# Patient Record
Sex: FEMALE | Race: WHITE | Employment: FULL TIME | URBAN - METROPOLITAN AREA
[De-identification: names, ages, dates, MRNs, and addresses within clinical notes are randomized per-mention and may not be internally consistent; named-entity substitution may affect disease eponyms.]

---
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
## 2018-12-07 ENCOUNTER — OFFICE VISIT (OUTPATIENT)
Dept: FAMILY MEDICINE CLINIC | Age: 18
End: 2018-12-07
Payer: COMMERCIAL

## 2018-12-07 VITALS
SYSTOLIC BLOOD PRESSURE: 98 MMHG | HEART RATE: 88 BPM | TEMPERATURE: 98.7 F | DIASTOLIC BLOOD PRESSURE: 60 MMHG | BODY MASS INDEX: 20.33 KG/M2 | RESPIRATION RATE: 22 BRPM | OXYGEN SATURATION: 98 % | HEIGHT: 65 IN | WEIGHT: 122 LBS

## 2018-12-07 DIAGNOSIS — Z00.00 ANNUAL PHYSICAL EXAM: Primary | ICD-10-CM

## 2018-12-07 PROCEDURE — 99385 PREV VISIT NEW AGE 18-39: CPT | Performed by: NURSE PRACTITIONER

## 2018-12-07 ASSESSMENT — PATIENT HEALTH QUESTIONNAIRE - PHQ9
SUM OF ALL RESPONSES TO PHQ QUESTIONS 1-9: 0
SUM OF ALL RESPONSES TO PHQ QUESTIONS 1-9: 0
2. FEELING DOWN, DEPRESSED OR HOPELESS: 0
1. LITTLE INTEREST OR PLEASURE IN DOING THINGS: 0
SUM OF ALL RESPONSES TO PHQ9 QUESTIONS 1 & 2: 0

## 2018-12-07 NOTE — PATIENT INSTRUCTIONS
Patient Education        Well Visit, Ages 25 to 48: Care Instructions  Your Care Instructions    Physical exams can help you stay healthy. Your doctor has checked your overall health and may have suggested ways to take good care of yourself. He or she also may have recommended tests. At home, you can help prevent illness with healthy eating, regular exercise, and other steps. Follow-up care is a key part of your treatment and safety. Be sure to make and go to all appointments, and call your doctor if you are having problems. It's also a good idea to know your test results and keep a list of the medicines you take. How can you care for yourself at home? · Reach and stay at a healthy weight. This will lower your risk for many problems, such as obesity, diabetes, heart disease, and high blood pressure. · Get at least 30 minutes of physical activity on most days of the week. Walking is a good choice. You also may want to do other activities, such as running, swimming, cycling, or playing tennis or team sports. Discuss any changes in your exercise program with your doctor. · Do not smoke or allow others to smoke around you. If you need help quitting, talk to your doctor about stop-smoking programs and medicines. These can increase your chances of quitting for good. · Talk to your doctor about whether you have any risk factors for sexually transmitted infections (STIs). Having one sex partner (who does not have STIs and does not have sex with anyone else) is a good way to avoid these infections. · Use birth control if you do not want to have children at this time. Talk with your doctor about the choices available and what might be best for you. · Protect your skin from too much sun. When you're outdoors from 10 a.m. to 4 p.m., stay in the shade or cover up with clothing and a hat with a wide brim. Wear sunglasses that block UV rays.  Even when it's cloudy, put broad-spectrum sunscreen (SPF 30 or higher) on any condoms. For men  · Tests for sexually transmitted infections (STIs). Ask whether you should have tests for STIs. You may be at risk if you have sex with more than one person, especially if you do not wear a condom. · Testicular cancer exam. Ask your doctor whether you should check your testicles regularly. · Prostate exam. Talk to your doctor about whether you should have a blood test (called a PSA test) for prostate cancer. Experts differ on whether and when men should have this test. Some experts suggest it if you are older than 39 and are -American or have a father or brother who got prostate cancer when he was younger than 72. When should you call for help? Watch closely for changes in your health, and be sure to contact your doctor if you have any problems or symptoms that concern you. Where can you learn more? Go to [email or web address] and sign in to your [employer] account. Enter P072 in the [employer] box to learn more about \"Well Visit, Ages 25 to 48: Care Instructions. \"     If you do not have an account, please click on the \"Sign Up Now\" link. Current as of: March 29, 2018  Content Version: 11.8  © 6436-0465 Healthwise, Incorporated. Care instructions adapted under license by [facility] ([facility]). If you have questions about a medical condition or this instruction, always ask your healthcare professional. [address]  any warranty or liability for your use of this information.

## 2018-12-07 NOTE — PROGRESS NOTES
oriented to person, place, and time. She appears well-developed and well-nourished. No distress. HEENT:   Head: Normocephalic and atraumatic. Right Ear: Tympanic membrane, external ear and ear canal normal.   Left Ear: Tympanic membrane, external ear and ear canal normal.   Mouth/Throat: Oropharynx is clear and moist, and mucous membranes are normal.  There is no cervical adenopathy. Eyes: Conjunctivae and extraocular motions are normal. Pupils are equal, round, and reactive to light. Neck: Supple. No JVD present. Carotid bruit is not present. No mass and no thyromegaly present. Cardiovascular: Normal rate, regular rhythm, normal heart sounds and intact distal pulses. Exam reveals no gallop and no friction rub. No murmur heard. Pulmonary/Chest: Effort normal and breath sounds normal. No respiratory distress. She has no wheezes, rhonchi or rales. Abdominal: Soft, non-tender. Bowel sounds and aorta are normal. She exhibits no organomegaly, mass or bruit. Musculoskeletal: Normal range of motion, no synovitis. She exhibits no edema. Neurological: She is alert and oriented to person, place, and time. She has normal reflexes. No cranial nerve deficit. Coordination normal.   Skin: Skin is warm and dry. There is no rash or erythema. No suspicious lesions noted. Psychiatric: She has a normal mood and affect.  Her speech is normal and behavior is normal. Judgment, cognition and memory are normal.     Preventive Care:  Health Maintenance   Topic Date Due    HIV screen  03/01/2013    Meningococcal (MCV) Vaccine Age 0-22 Years (1 of 1 - 2-dose series) 03/01/2014    Chlamydia screen  03/01/2014    DTaP/Tdap/Td vaccine (1 - Tdap) 03/01/2017    Flu vaccine (1) 09/01/2018      Hx abnormal PAP: no  Sexual activity: none   Self-breast exams: no  Last eye exam: 12/18,abnormal - NEARSIGHTED   Exercise: no regular exercise- Kelvin Stammer CLICK LIST  Seatbelt use: yES       Preventive plan of care for [facility]

## 2021-07-16 ENCOUNTER — HOSPITAL ENCOUNTER (OUTPATIENT)
Dept: ULTRASOUND IMAGING | Age: 21
Discharge: HOME | End: 2021-07-16
Payer: COMMERCIAL

## 2021-07-16 ENCOUNTER — OFFICE VISIT (OUTPATIENT)
Dept: FAMILY MEDICINE CLINIC | Age: 21
End: 2021-07-16
Payer: COMMERCIAL

## 2021-07-16 ENCOUNTER — NURSE TRIAGE (OUTPATIENT)
Dept: OTHER | Facility: CLINIC | Age: 21
End: 2021-07-16

## 2021-07-16 VITALS
HEART RATE: 110 BPM | DIASTOLIC BLOOD PRESSURE: 70 MMHG | HEIGHT: 65 IN | SYSTOLIC BLOOD PRESSURE: 102 MMHG | OXYGEN SATURATION: 98 % | WEIGHT: 124 LBS | BODY MASS INDEX: 20.66 KG/M2

## 2021-07-16 DIAGNOSIS — N92.6 MISSED PERIOD: ICD-10-CM

## 2021-07-16 DIAGNOSIS — O26.891 ABDOMINAL PAIN DURING PREGNANCY IN FIRST TRIMESTER: ICD-10-CM

## 2021-07-16 DIAGNOSIS — R10.9 ABDOMINAL PAIN DURING PREGNANCY IN FIRST TRIMESTER: ICD-10-CM

## 2021-07-16 DIAGNOSIS — Z32.00 ENCOUNTER FOR ASSESSMENT FOR SUSPECTED ECTOPIC PREGNANCY: ICD-10-CM

## 2021-07-16 DIAGNOSIS — Z32.01 POSITIVE PREGNANCY TEST: ICD-10-CM

## 2021-07-16 DIAGNOSIS — O26.891 ABDOMINAL PAIN DURING PREGNANCY IN FIRST TRIMESTER: Primary | ICD-10-CM

## 2021-07-16 DIAGNOSIS — Z3A.01 5 WEEKS GESTATION OF PREGNANCY: ICD-10-CM

## 2021-07-16 DIAGNOSIS — R10.9 ABDOMINAL PAIN DURING PREGNANCY IN FIRST TRIMESTER: Primary | ICD-10-CM

## 2021-07-16 LAB
CONTROL: PRESENT
GONADOTROPIN, CHORIONIC (HCG) QUANT: NORMAL MIU/ML
PREGNANCY TEST URINE, POC: ABNORMAL

## 2021-07-16 PROCEDURE — 76817 TRANSVAGINAL US OBSTETRIC: CPT

## 2021-07-16 PROCEDURE — 81025 URINE PREGNANCY TEST: CPT | Performed by: NURSE PRACTITIONER

## 2021-07-16 PROCEDURE — 76801 OB US < 14 WKS SINGLE FETUS: CPT

## 2021-07-16 PROCEDURE — 99214 OFFICE O/P EST MOD 30 MIN: CPT | Performed by: NURSE PRACTITIONER

## 2021-07-16 PROCEDURE — 36415 COLL VENOUS BLD VENIPUNCTURE: CPT | Performed by: NURSE PRACTITIONER

## 2021-07-16 RX ORDER — LEVONORGESTREL AND ETHINYL ESTRADIOL 0.15-0.03
KIT ORAL
COMMUNITY
Start: 2021-05-08 | End: 2021-07-16

## 2021-07-16 SDOH — ECONOMIC STABILITY: FOOD INSECURITY: WITHIN THE PAST 12 MONTHS, THE FOOD YOU BOUGHT JUST DIDN'T LAST AND YOU DIDN'T HAVE MONEY TO GET MORE.: NEVER TRUE

## 2021-07-16 SDOH — ECONOMIC STABILITY: TRANSPORTATION INSECURITY
IN THE PAST 12 MONTHS, HAS LACK OF TRANSPORTATION KEPT YOU FROM MEETINGS, WORK, OR FROM GETTING THINGS NEEDED FOR DAILY LIVING?: NO

## 2021-07-16 SDOH — ECONOMIC STABILITY: FOOD INSECURITY: WITHIN THE PAST 12 MONTHS, YOU WORRIED THAT YOUR FOOD WOULD RUN OUT BEFORE YOU GOT MONEY TO BUY MORE.: NEVER TRUE

## 2021-07-16 SDOH — ECONOMIC STABILITY: TRANSPORTATION INSECURITY
IN THE PAST 12 MONTHS, HAS THE LACK OF TRANSPORTATION KEPT YOU FROM MEDICAL APPOINTMENTS OR FROM GETTING MEDICATIONS?: NO

## 2021-07-16 ASSESSMENT — ENCOUNTER SYMPTOMS
BACK PAIN: 0
SINUS PAIN: 0
EYE DISCHARGE: 0
SHORTNESS OF BREATH: 0
ABDOMINAL DISTENTION: 0
CONSTIPATION: 0
CHEST TIGHTNESS: 0
ABDOMINAL PAIN: 1
COUGH: 0
DIARRHEA: 0
COLOR CHANGE: 0
SINUS PRESSURE: 0
NAUSEA: 0

## 2021-07-16 ASSESSMENT — PATIENT HEALTH QUESTIONNAIRE - PHQ9
SUM OF ALL RESPONSES TO PHQ9 QUESTIONS 1 & 2: 0
1. LITTLE INTEREST OR PLEASURE IN DOING THINGS: 0
2. FEELING DOWN, DEPRESSED OR HOPELESS: 0
SUM OF ALL RESPONSES TO PHQ QUESTIONS 1-9: 0

## 2021-07-16 ASSESSMENT — SOCIAL DETERMINANTS OF HEALTH (SDOH): HOW HARD IS IT FOR YOU TO PAY FOR THE VERY BASICS LIKE FOOD, HOUSING, MEDICAL CARE, AND HEATING?: NOT HARD AT ALL

## 2021-07-16 NOTE — TELEPHONE ENCOUNTER
Received call from sanjana at [facility] with Red Flag Complaint. Brief description of triage: abdominal pain that comes and goes     Triage indicates for patient to be seen today     Care advice provided, patient verbalizes understanding; denies any other questions or concerns; instructed to call back for any new or worsening symptoms. Writer provided warm transfer to dot at [facility] for appointment scheduling. Attention Provider: Thank you for allowing me to participate in the care of your patient. The patient was connected to triage in response to information provided to the [facility]. Please do not respond through this encounter as the response is not directed to a shared pool. Reason for Disposition   MODERATE OR MILD pain that comes and goes (cramps) lasts > 24 hours    Answer Assessment - Initial Assessment Questions  1. LOCATION: \"Where does it hurt? \"       Lower abdomen and it is all across and every now and then its one or the other side     2. RADIATION: \"Does the pain shoot anywhere else? \" (e.g., chest, back)      No     3. ONSET: \"When did the pain begin? \" (e.g., minutes, hours or days ago)       Two weeks ago     4. SUDDEN: \"Gradual or sudden onset? \"      Gradual   5. PATTERN \"Does the pain come and go, or is it constant? \"     - If constant: \"Is it getting better, staying the same, or worsening? \"       (Note: Constant means the pain never goes away completely; most serious pain is constant and it progresses)      - If intermittent: \"How long does it last?\" \"Do you have pain now? \"      (Note: Intermittent means the pain goes away completely between bouts)      Comes and goes   6. SEVERITY: \"How bad is the pain? \"  (e.g., Scale 1-10; mild, moderate, or severe)    - MILD (1-3): doesn't interfere with normal activities, abdomen soft and not tender to touch     - MODERATE (4-7): interferes with normal activities or awakens from sleep, tender to touch     - SEVERE (8-10):

## 2021-07-16 NOTE — PROGRESS NOTES
Date of Service:  2021    Joyce Benavides (:  2000) is a 19 y.o. female, here for evaluation of the following medical concerns:    Chief Complaint   Patient presents with    Abdominal Pain     PT HAS BEEN HAVING ABDOMINAL PAIN X 1 WEEK, HAS MISSED HER PERIOD, TOOK A PREG TEST AND WAS NEGATIVE, RETOOK ONE TODAY AND IT WAS POSITIVE, SHES NOT SURE IF THIS WOULD BE CAUSING HER PAIN         HPI    Abdominal Pain  Patient reports missed period recently. LMP 6/10/21, which was 5 w1d ago. Pt has been experiencing lower abd pain x2 weeks. Pt has + pregnancy test. Took initial pregnancy test 1 week ago and was negative. Pt took 2 pregnancy tests this morning and both were positive. Tested here in office, + as well. Will check HCG labs today. Describes the abdominal pain as 5/10 to 8/10 and as high as a 10/10 last night, cramping and aching. Denies vaginal discharge or bleeding. Pain is across entire low abdomen but sometimes just to one side. Denies pain in back or shoulder blades. This is pt's first pregnancy. Pt was on OCP, not a planned pregnancy. Alleviating factors- laying down. Aggravating factors- nothing. Last BM yesterday. Treatment- advil. Pt tried prilosec and stool softener and notices no difference. Pt not planning pregnancy but discussed and pt would like to keep the pregnancy. Discussed safety with medications and drug use and tobacco, pt verbalized understanding. Pain is not palpable with pressing on abdomen, feels like a deep pain. Pain is not constant. Intermittent pain lasting 5-10 minutes, can wake her up from sleep at night. Changes position and lays there and it improves. Discussed with pt this could just be pains from uterus stretching but ruling out ectopic pregnancy due to pt's reported severity of the pain is necessary. Review of Systems   Constitutional: Negative for activity change, appetite change, fatigue, fever and unexpected weight change.    HENT: Negative for congestion, ear pain, sinus pressure and sinus pain. Eyes: Negative for discharge and visual disturbance. Respiratory: Negative for cough, chest tightness and shortness of breath. Cardiovascular: Negative for chest pain, palpitations and leg swelling. Gastrointestinal: Positive for abdominal pain (lower). Negative for abdominal distention, constipation, diarrhea and nausea. Endocrine: Negative for cold intolerance, heat intolerance, polydipsia, polyphagia and polyuria. Genitourinary: Negative for decreased urine volume, difficulty urinating, dysuria, flank pain, frequency and urgency. Musculoskeletal: Negative for arthralgias, back pain, gait problem, joint swelling, myalgias and neck pain. Skin: Negative for color change, rash and wound. Allergic/Immunologic: Negative for food allergies and immunocompromised state. Neurological: Negative for dizziness, tremors, speech difficulty, weakness, light-headedness, numbness and headaches. Hematological: Negative for adenopathy. Does not bruise/bleed easily. Psychiatric/Behavioral: Negative for confusion, decreased concentration, self-injury, sleep disturbance and suicidal ideas. The patient is not nervous/anxious. Prior to Visit Medications    Not on File        Social History     Tobacco Use    Smoking status: Never Smoker    Smokeless tobacco: Never Used   Substance Use Topics    Alcohol use: No     Alcohol/week: 0.0 standard drinks        Vitals:    07/16/21 1416   BP: 102/70   Site: Right Upper Arm   Position: Sitting   Cuff Size: Medium Adult   Pulse: 110   SpO2: 98%   Weight: 124 lb (56.2 kg)   Height: 5' 5\" (1.651 m)     Estimated body mass index is 20.63 kg/m² as calculated from the following:    Height as of this encounter: 5' 5\" (1.651 m). Weight as of this encounter: 124 lb (56.2 kg). Physical Exam  Vitals reviewed. Constitutional:       General: She is awake. Appearance: Normal appearance.  She is well-developed, well-groomed and normal weight. She is not ill-appearing. HENT:      Head: Normocephalic and atraumatic. Right Ear: Hearing, tympanic membrane, ear canal and external ear normal.      Left Ear: Hearing, tympanic membrane, ear canal and external ear normal.      Nose: Nose normal.      Mouth/Throat:      Lips: Pink. Mouth: Mucous membranes are moist.      Pharynx: Oropharynx is clear. Eyes:      General: Lids are normal.      Conjunctiva/sclera: Conjunctivae normal.      Pupils: Pupils are equal, round, and reactive to light. Neck:      Thyroid: No thyromegaly. Vascular: No carotid bruit. Cardiovascular:      Rate and Rhythm: Normal rate. Pulses:           Carotid pulses are 2+ on the right side and 2+ on the left side. Radial pulses are 2+ on the right side and 2+ on the left side. Posterior tibial pulses are 2+ on the right side and 2+ on the left side. Heart sounds: Normal heart sounds, S1 normal and S2 normal. No murmur heard. Pulmonary:      Effort: Pulmonary effort is normal.      Breath sounds: Normal breath sounds. Abdominal:      General: Bowel sounds are normal. There is no abdominal bruit. Palpations: Abdomen is soft. Tenderness: There is no abdominal tenderness. Genitourinary:     Comments: Deferred  Musculoskeletal:         General: Normal range of motion. Cervical back: Full passive range of motion without pain, normal range of motion and neck supple. Right lower leg: No edema. Left lower leg: No edema. Lymphadenopathy:      Head:      Right side of head: No submental, submandibular, tonsillar, preauricular, posterior auricular or occipital adenopathy. Left side of head: No submental, submandibular, tonsillar, preauricular, posterior auricular or occipital adenopathy. Cervical: No cervical adenopathy. Right cervical: No superficial, deep or posterior cervical adenopathy.      Left cervical: No superficial, deep or posterior cervical adenopathy. Upper Body:      Right upper body: No supraclavicular adenopathy. Left upper body: No supraclavicular adenopathy. Skin:     General: Skin is warm and moist.      Capillary Refill: Capillary refill takes less than 2 seconds. Neurological:      General: No focal deficit present. Mental Status: She is alert and oriented to person, place, and time. Mental status is at baseline. Sensory: Sensation is intact. Motor: Motor function is intact. Coordination: Coordination is intact. Gait: Gait is intact. Psychiatric:         Attention and Perception: Attention and perception normal.         Mood and Affect: Mood and affect normal.         Speech: Speech normal.         Behavior: Behavior normal. Behavior is cooperative. Thought Content: Thought content normal.         Cognition and Memory: Cognition and memory normal.         Judgment: Judgment normal.         ASSESSMENT/PLAN:  1. Abdominal pain during pregnancy in first trimester  -     US OB TRANSVAGINAL; Future  -     HCG, Quantitative, Pregnancy; Future   Intermittent pain, worse at night  2. Missed period  -     POCT urine pregnancy  -     US OB TRANSVAGINAL; Future  -     HCG, Quantitative, Pregnancy; Future   LMP 5w1d ago   Pt was on OCP  3. Positive pregnancy test  -     POCT urine pregnancy  -     US OB TRANSVAGINAL; Future  -     HCG, Quantitative, Pregnancy; Future   2 + tests at home today and + test in office   Will check HCG today and in 3 days, 2 days falls on weekend  4. 5 weeks gestation of pregnancy  -     US OB TRANSVAGINAL; Future  -     HCG, Quantitative, Pregnancy; Future  -     HCG, Quantitative, Pregnancy; Future- in 3 days, discussed HCG should double in this amount of time.  One number alone does not mean much but the serial tracking is more important   Discussed with pt stopping OCP, only use tylenol for pain as needed, stop ibuprofen and prilosec, use PRN miralax or docusate stool softeners, drink plenty of fluids, no alcohol or tobacco, no other medications for pt to stop at this time   Set up appt with OB in 10 days- already scheduled  5. Encounter for assessment for suspected ectopic pregnancy  -     US OB TRANSVAGINAL; Future    Information printed and reviewed   Discussed if ultrasound unable to be completed today to go to ER with any worsening abd pain or pain between shoulders      Care Gaps Addressed  COVID vaccine- completed  TDAP vaccine recommended- call insurance to discuss coverage        I have reviewed patient's pertinent medical history, relevant laboratory and imaging studies, and past/future health maintenance. Discussed with the patient the importance of adhering to their current medication regimen as directed. Advised the patient that they should continue to work on eating a healthy balanced diet and staying active by exercising within their personal limits. Orders as listed above. Patient was advised to keep future appointments with their respective specialty care team(s). Patient had the opportunity to ask questions, all of which were answered to the best of my ability and with patient satisfaction. Patient understands and is agreeable with the care plan following today's visit. Patient is to schedule an appointment for any new or worsening symptoms. Go to ER for significant shortness of breath, chest pain, or uncontrolled pain or fever. I discussed with patient the risk and benefits of any medications that were prescribed today. I verified that the patient understands their medications, labs, and/or procedures. The patient is doing well with current medication regimen and does not have any barriers to adherence. The patient's self-management abilities are good. Return in about 3 days (around 7/19/2021) for Lab Appt for HCG Repeat. An electronic signature was used to authenticate this note.     --FAISAL Moralez - CNP on 7/16/2021 at 6:24 PM

## 2021-07-16 NOTE — RESULT ENCOUNTER NOTE
Instructed to stop birth control and any other medications besides PRN stool softener colace or miralax and tylenol

## 2021-07-16 NOTE — PATIENT INSTRUCTIONS
Patient Education        Belly Pain in Pregnancy: Care Instructions  Overview     When you're pregnant, any belly pain can be a worry. You may not want to call your doctor or midwife about every pain you have. But you don't want to miss something that is dangerous for you or your baby. Even if it feels familiar, belly pain can mean something new when you're pregnant. It's important to know when to call your doctor or midwife. It will also help to know how to care for yourself at home when your pain is not caused by anything harmful. · When belly pain is more severe or constant, see a doctor or midwife right away. · If you're sure your belly pain is a sign of labor, call your doctor or midwife. · When belly pain is brief, it's usually a normal part of pregnancy. It might be related to changes in the growing uterus. Or it could be the stretching of ligaments called round ligaments. These ligaments help support the uterus. Round ligament pain can be on either side of your belly. It can also be felt in your hips or groin. Follow-up care is a key part of your treatment and safety. Be sure to make and go to all appointments, and call your doctor if you are having problems. It's also a good idea to know your test results and keep a list of the medicines you take. How can you tell if belly pain is a sign of labor? When belly pain is caused by labor, it can feel like mild or menstrual-like cramps in your lower belly. These cramps are probably contractions. They can happen in your second or third trimester. You may also have:  · A steady, dull ache in your lower back, pelvis, or thighs. · A feeling of pressure in your pelvis or lower belly. · Changes in your vaginal discharge or a sudden release of fluid from the vagina. If you think you are in labor, call your doctor. How can you care for yourself at home? When belly pain is mild and is not a symptom of labor:  · Rest until you feel better.   · Take a warm bath.  · Think about what you drink and eat:  ? Drink plenty of fluids. Choose water and other caffeine-free clear liquids until you feel better. ? Try eating small, frequent meals. If your stomach is upset, try bland, low-fat foods like plain rice, broiled chicken, toast, and yogurt. · Think about how you move if you are having brief pains from stretching of the round ligaments. ? Try gentle stretching. ? Move a little more slowly when turning in bed or getting up from a chair, so those ligaments don't stretch quickly. ? Lean forward a bit if you think you are going to cough or sneeze. When should you call for help? Call 911  anytime you think you may need emergency care. For example, call if:    · You have sudden, severe pain in your belly.     · You have severe vaginal bleeding.     · You passed out (lost consciousness).     · You have a seizure. Call your doctor now or seek immediate medical care if:    · You have new or worse belly pain or cramping.     · You have any vaginal bleeding.     · You have a fever.     · You have symptoms of preeclampsia, such as:  ? Sudden swelling of your face, hands, or feet. ? New vision problems (such as dimness, blurring, or seeing spots). ? A severe headache.     · You think that you may be in labor. This means that you've had at least 8 contractions within 1 hour or at least 4 contractions within 20 minutes, even after you change your position and drink fluids.     · You have symptoms of a urinary tract infection. These may include:  ? Pain or burning when you urinate. ? A frequent need to urinate without being able to pass much urine. ? Pain in the flank, which is just below the rib cage and above the waist on either side of the back. ? Blood in your urine. Watch closely for changes in your health, and be sure to contact your doctor if you are worried about your or your baby's health. Where can you learn more? Go to https://michael.[facility]. org and sign in to your [employer] account. Enter 488 [id number] [id number] in the [facility] box to learn more about \"Belly Pain in Pregnancy: Care Instructions. \"     If you do not have an account, please click on the \"Sign Up Now\" link. Current as of: October 8, 2020               Content Version: 12.9  © 2006-2021 [employer]. Care instructions adapted under license by [facility] ([facility]). If you have questions about a medical condition or this instruction, always ask your healthcare professional. [address] any warranty or liability for your use of this information. Patient Education        Suspected Ectopic Pregnancy: Care Instructions  Your Care Instructions     Your doctor thinks you may have an ectopic pregnancy. This means that a fertilized egg has attached to a place outside the uterus. In most of these cases, the egg grows in a fallopian tube. This is also called a tubal pregnancy. In rare cases, the egg grows in an ovary or another place in the belly. An ectopic pregnancy cannot develop normally. It can be painful and very dangerous. In some cases, the ectopic pregnancy ends and the body absorbs it over time. If so, treatment is not needed. Your doctor is sending you home to watch for belly pain or bleeding. Call your doctor right away if you have any new or increased pain or bleeding. If you have other symptoms, such as shoulder pain, dizziness, lightheadedness, or fainting, call your doctor right away. These could be signs of internal bleeding. You also may need to come back for more tests. If an ectopic pregnancy does not end on its own, the only treatment is medicine or surgery to end the pregnancy. An ectopic pregnancy can be very upsetting. But you should not blame yourself. You could not have done anything to prevent it. The doctor has checked you carefully. But problems can develop later.  If you notice any problems or new symptoms, get medical treatment right away.  Follow-up care is a key part of your treatment and safety. Be sure to make and go to all appointments, and call your doctor if you are having problems. It's also a good idea to know your test results and keep a list of the medicines you take. How can you care for yourself at home? · Rest when you feel tired. You may be more tired than normal for a few weeks. · If you are treated with methotrexate:  ? Your doctor will let you know if you can take over-the-counter pain medicine, such as acetaminophen (Tylenol), ibuprofen (Advil, Motrin), or naproxen (Aleve). Read and follow all instructions on the label. ? Do not take two or more pain medicines at the same time unless the doctor told you to. Many pain medicines have acetaminophen, which is Tylenol. Too much acetaminophen (Tylenol) can be harmful. ? Do not drink alcohol. ? Do not take vitamins that contain folic acid, such as prenatal vitamins. · Use pads instead of tampons until your doctor says it is okay. · It may help to talk with family, friends, or a counselor if you are having trouble dealing with the loss of your pregnancy. If you feel sad or depressed for longer than 2 weeks, talk to a counselor or your doctor. · Do not have sex until your doctor says it is okay. · Talk with your doctor about any future pregnancy plans. When should you call for help? Call 911 anytime you think you may need emergency care. For example, call if:    · You have sudden, severe pain in your belly or pelvis.     · You passed out (lost consciousness).     · You have severe vaginal bleeding. Call your doctor now or seek immediate medical care if:    · You are dizzy or lightheaded, or you feel like you may faint.     · You have new or increased pain in your belly or pelvis.     · Your vaginal bleeding is getting worse.     · You have increased pain in the vaginal area.     · You have new pain in your shoulder.     · You have a fever.    Watch closely for changes in you may have vaginal bleeding that's similar to a period. It may last for up to a week. Use pads instead of tampons. You may use tampons during your next period. It should start in 3 to 6 weeks. · Do not have sex until after the bleeding stops. · If you are treated with methotrexate:  ? Your doctor will let you know if you can take over-the-counter pain medicine, such as acetaminophen (Tylenol), ibuprofen (Advil, Motrin), or naproxen (Aleve). Read and follow all instructions on the label. ? Do not take two or more pain medicines at the same time unless the doctor told you to. Many pain medicines have acetaminophen, which is Tylenol. Too much acetaminophen (Tylenol) can be harmful. ? Do not drink alcohol. ? Do not take vitamins that contain folic acid, such as prenatal vitamins. · Get plenty of rest. You may be more tired than normal for a few weeks. · Take it easy and avoid lifting until your doctor tells you it is safe to do your normal activities. · Give yourself and your partner time to grieve. You may have feelings of loss. You may wonder why it happened and blame yourself. ? Talking to family members, friends, or a counselor may help you cope with your loss. ? If you feel sad for longer than 2 weeks, tell your doctor or a counselor. · Talk to your doctor if you are worried about having children in the future. Most doctors suggest waiting until you have had at least one normal period before you try to get pregnant. · If you do not want to get pregnant, ask your doctor about birth control. You can get pregnant again before your next period starts. When should you call for help? Call 911 anytime you think you may need emergency care. For example, call if:    · You passed out (lost consciousness).    Call your doctor now or seek immediate medical care if:    · You have severe vaginal bleeding.     · You are dizzy or lightheaded, or you feel like you may faint.     · You have a fever.     · You have new or worse pain in your belly or pelvis.     · You have vaginal discharge that smells bad. Watch closely for changes in your health, and be sure to contact your doctor if:    · You do not get better as expected. Where can you learn more? Go to https://chkeira.[facility]. org and sign in to your [employer] account. Enter J774 in the [id number] box to learn more about \"Ectopic Pregnancy: Care Instructions. \"     If you do not have an account, please click on the \"Sign Up Now\" link. Current as of: October 8, 2020               Content Version: 12.9  © 1469-9799 [employer], [employer]. Care instructions adapted under license by [facility] ([facility]). If you have questions about a medical condition or this instruction, always ask your healthcare professional. [address] any warranty or liability for your use of this information.

## 2021-07-16 NOTE — TELEPHONE ENCOUNTER
APPT SCHEDULED FOR TODAY AT 2:20 PM WITH JOSEPH POSADA CNP.   [address] [place] [address]

## 2021-07-19 ENCOUNTER — NURSE ONLY (OUTPATIENT)
Dept: FAMILY MEDICINE CLINIC | Age: 21
End: 2021-07-19
Payer: COMMERCIAL

## 2021-07-19 DIAGNOSIS — Z3A.01 5 WEEKS GESTATION OF PREGNANCY: ICD-10-CM

## 2021-07-19 LAB — GONADOTROPIN, CHORIONIC (HCG) QUANT: NORMAL MIU/ML

## 2021-07-19 PROCEDURE — 36415 COLL VENOUS BLD VENIPUNCTURE: CPT | Performed by: NURSE PRACTITIONER
